# Patient Record
Sex: FEMALE | Race: WHITE | NOT HISPANIC OR LATINO | Employment: OTHER | URBAN - METROPOLITAN AREA
[De-identification: names, ages, dates, MRNs, and addresses within clinical notes are randomized per-mention and may not be internally consistent; named-entity substitution may affect disease eponyms.]

---

## 2017-02-01 ENCOUNTER — GENERIC CONVERSION - ENCOUNTER (OUTPATIENT)
Dept: OTHER | Facility: OTHER | Age: 82
End: 2017-02-01

## 2018-01-09 NOTE — MISCELLANEOUS
Assessment    1  Congestive heart failure (428 0) (I50 9)   2  Chronic kidney disease, stage 3 (585 3) (N18 3)   3  Anemia (285 9) (D64 9)   4  Hypertension (401 9) (I10)   5  Paroxysmal atrial fibrillation (427 31) (I48 0)    Plan  Small bowel obstruction    · (1) CBC/PLT/DIFF; Status:Active; Requested for:21Apr2016;    Perform:[facility]; Due:21Apr2017;Ordered; For:Small bowel obstruction; Ordered By:David Holley;   · (1) COMPREHENSIVE METABOLIC PANEL; Status:Active; Requested for:21Apr2016;    Perform:[facility]; Due:21Apr2017;Ordered; For:Small bowel obstruction; Ordered By:David Holley;   · (1) MAGNESIUM; Status:Active; Requested for:21Apr2016;    Perform:[facility]; Due:21Apr2017;Ordered; For:Small bowel obstruction; Ordered By:David Holley;   · Follow-up visit in 1 month Evaluation and Treatment  Follow-up  Status: Complete  Done:  21Apr2016   Ordered; For: Small bowel obstruction; Ordered By: Ava Garcia Performed:  Due: 26Apr2016; Last Updated By: Bryan Mazariegos; 4/22/2016 9:09:55 AM    Discussion/Summary  Discussion Summary:   The patient is doing pretty well at the moment  Her congestive heart failure is adequately compensated  Her blood pressure is stable  Her kidney function is stable  I asked her to continue with her current medications  Metolazone will be utilized if she gains more than 3 pounds  Lab work will be checked  She will return here in one month  Chief Complaint  Chief Complaint Free Text Note Form: d/c from [facility] 4/1/2016  Metolazone discontinued yesterday due to weight loss of 7 lbs       History of Present Illness  TCM Communication [facility]: The patient is being contacted for 4/21/16  She was hospitalized [facility]  The date of admission: 3/25/2016, date of discharge: 4/1/2016  She was discharged to home, assisted living  Medications reviewed and updated today  She scheduled a follow up appointment     Communication performed and completed by Shamika Sykes   HPI: The patient returned to the office for reevaluation after recent hospitalization at [facility]  The patient was initially admitted after a fall  She sustained a gluteal hematoma  Her anemia worsened as a result of this  The situation was stabilized and she will return to her assisted living  However she was readmitted shortly thereafter with congestive heart failure  Since she has been out of the hospital she has been doing fairly well  Last week however she started to gain weight  Her metolazone was increased and she lost 7 pounds  At the moment she is feeling quite well  She denies chest pain, shortness of breath, palpitations, or dizziness  Review of Systems  Complete-Female:   Constitutional: No fever, no chills, feels well, no tiredness, no recent weight gain or weight loss  Eyes: No complaints of eye pain, no red eyes, no eyesight problems, no discharge, no dry eyes, no itching of eyes  ENT: no complaints of earache, no loss of hearing, no nose bleeds, no nasal discharge, no sore throat, no hoarseness  Cardiovascular: as noted in HPI  Respiratory: No complaints of shortness of breath, no wheezing, no cough, no SOB on exertion, no orthopnea, no PND  Gastrointestinal: No complaints of abdominal pain, no constipation, no nausea or vomiting, no diarrhea, no bloody stools  Genitourinary: No complaints of dysuria, no incontinence, no pelvic pain, no dysmenorrhea, no vaginal discharge or bleeding  Musculoskeletal: No complaints of arthralgias, no myalgias, no joint swelling or stiffness, no limb pain or swelling  Neurological: No complaints of headache, no confusion, no convulsions, no numbness, no dizziness or fainting, no tingling, no limb weakness, no difficulty walking  Psychiatric: Not suicidal, no sleep disturbance, no anxiety or depression, no change in personality, no emotional problems  Endocrine: no muscle weakness  Hematologic/Lymphatic: no tendency for easy bruising  Active Problems    1  Anemia (285 9) (D64 9)   2  Chronic kidney disease, stage 3 (585 3) (N18 3)   3  Congestive heart failure (428 0) (I50 9)   4  Constipation (564 00) (K59 00)   5  Depression (311) (F32 9)   6  Hypertension (401 9) (I10)   7  Hypokalemia (276 8) (E87 6)   8  Insomnia (780 52) (G47 00)   9  Need for pneumococcal vaccination (V03 82) (Z23)   10  Need for prophylactic vaccination and inoculation against influenza (V04 81) (Z23)   11  Paroxysmal atrial fibrillation (427 31) (I48 0)   12  Screening for genitourinary condition (V81 6) (Z13 89)   13  Small bowel obstruction (560 9) (K56 69)    Social History    · Never a smoker   · No alcohol use  Social History Reviewed: The social history was reviewed and is unchanged  Current Meds   1  Aspirin 81 MG TABS; TAKE 1 TABLET DAILY; Therapy: (Recorded:06Mar2015) to Recorded   2  Carvedilol 12 5 MG Oral Tablet; TAKE 1 TABLET THREE TIMES A DAY  Requested for:   59HRL8491; Last Rx:23Nov2015 Ordered   3  Colace 100 MG Oral Capsule; TAKE 1 CAPSULE TWICE DAILY AS NEEDED  Requested   for: 37ZZA0565; Last Rx:23Nov2015 Ordered   4  Ferrous Sulfate 325 (65 Fe) MG Oral Tablet; TAKE 1 TABLET BY MOUTH BID  WITH   FOOD; Therapy: (Recorded:06Mar2015) to Recorded   5  FLUoxetine HCl - 10 MG Oral Capsule; TAKE 1 CAPSULE DAILY  Requested for:   41FCZ4342; Last Rx:23Nov2015 Ordered   6  Furosemide 80 MG Oral Tablet; TAKE 1 TABLET TWICE DAILY  Requested for:   04GXF6743; Last Rx:23Nov2015 Ordered   7  Melatonin 3 MG Oral Tablet; TAKE 2 TABLETS (6MG) BY MOUTH DAILY AT 8PM (SLEEP); Therapy: 94FAC6661 to (Last Rx:93Ona4945)  Requested for: 39Zkw6647 Ordered   8  Metolazone 5 MG Oral Tablet; as needed for edema; Therapy: (Recorded:21Apr2016) to  Requested for: 21Apr2016 Recorded   9  Tylenol 325 MG Oral Tablet; Take 2 tablets every 4 hours as needed;    Therapy: (Recorded:13Ydr8121) to Recorded    Allergies    1  No Known Drug Allergies    Vitals  Signs [Data Includes: Current Encounter]   Recorded: 21Apr2016 02:52PM   Temperature: 98 2 F  Heart Rate: 80  Respiration: 20  Systolic: 301  Diastolic: 70  Height: 4 ft 8 in  Weight: 131 lb   BMI Calculated: 29 37  BSA Calculated: 1 48    Physical Exam    Constitutional   General appearance: No acute distress, well appearing and well nourished  Pulmonary   Respiratory effort: No increased work of breathing or signs of respiratory distress  Auscultation of lungs: Clear to auscultation  Cardiovascular   Palpation of heart: Normal PMI, no thrills  Auscultation of heart: Normal rate and rhythm, normal S1 and S2, without murmurs  Examination of extremities for edema and/or varicosities: Abnormal   +2 edema  Carotid pulses: Normal     Abdomen   Abdomen: Non-tender, no masses  Liver and spleen: No hepatomegaly or splenomegaly  Lymphatic   Palpation of lymph nodes in neck: No lymphadenopathy  Musculoskeletal   Gait and station: Normal     Inspection/palpation of joints, bones, and muscles: Normal     Psychiatric   Orientation to person, place, and time: Abnormal   The patient was oriented to person place and time but her memory was obviously impaired  Mood and affect: Normal          Future Appointments    Date/Time Provider Specialty Site   05/26/2016 02:45 PM TANK Pena   [facility]     Signatures   Electronically signed by : TANK Rios ; Apr 22 2016  4:40PM EST                       (Author)

## 2018-01-10 NOTE — MISCELLANEOUS
Provider Comments  Provider Comments:   Patient NO SHOW on 3/3/2016 at 3:30pm letter sent to patient      Signatures   Electronically signed by : TANK Orantes ; Mar 17 2016  7:21AM EST                       (Author)

## 2018-01-12 NOTE — MISCELLANEOUS
Message   Date: 01 Feb 2017 2:09 PM EST, Recorded By: Jenny Polk   Calling For: Jenny Polk   Caller: [facility] nurse called to stated that the patient is in heart failure  She is currently getting Lasix 80mg BID and Zaroxyln 5mg  She is requesting an increase in the Ativan and Roxanol  Order would be for Ativan 0 5mg Q6H around the clock and Q2H prn and the Roxanol 5mg Q6h around the clock and Q2H prn  Dr Opal Slater these orders  Active Problems    1  Anemia (285 9) (D64 9)   2  Chronic kidney disease, stage 3 (585 3) (N18 3)   3  Congestive heart failure (428 0) (I50 9)   4  Constipation (564 00) (K59 00)   5  Cough (786 2) (R05)   6  Depression (311) (F32 9)   7  Hypertension (401 9) (I10)   8  Hypokalemia (276 8) (E87 6)   9  Insomnia (780 52) (G47 00)   10  Need for pneumococcal vaccination (V03 82) (Z23)   11  Need for prophylactic vaccination and inoculation against influenza (V04 81) (Z23)   12  Paroxysmal atrial fibrillation (427 31) (I48 0)   13  Screening for genitourinary condition (V81 6) (Z13 89)   14  Small bowel obstruction (560 9) (K56 69)    Current Meds   1  Aspirin 81 MG TABS; TAKE 1 TABLET DAILY; Therapy: (Recorded:06Mar2015) to Recorded   2  Ativan 0 5 MG Oral Tablet; TAKE 1 TABLET EVERY 6 HOURS AS NEEDED; Therapy: (Recorded:03Nov2016) to Recorded   3  Carvedilol 12 5 MG Oral Tablet; TAKE 1 TABLET BY MOUTH 3X DAILY AT 8AM-2PM-8PM   (HYPERTENSION); Therapy: 08Apr2016 to (Last Rx:08Apr2016)  Requested for: 08Apr2016 Ordered   4  Colace 100 MG Oral Capsule; TAKE 1 CAPSULE TWICE DAILY AS NEEDED    Requested for: 77UWL8259; Last Rx:23Nov2015 Ordered   5  Ferrous Sulfate 325 (65 Fe) MG Oral Tablet; 1 TAB BY MOUTH 2X DAILY AT 8AM & 5PM   (SUPPLEMENT); Therapy: 87AVH7140 to (Last Rx:05Jan2017)  Requested for: 53WZX0865 Ordered   6  FLUoxetine HCl - 10 MG Oral Capsule; TAKE 1 CAPSULE BY MOUTH DAILY AT 8AM   (PREVENT ANXIETY);    Therapy: 08Apr2016 to (Evaluate:2017)  Requested for: 38FUY9828; Last   LH:75OPO1999 Ordered   7  Furosemide 80 MG Oral Tablet; TAKE 1 TABLET BY MOUTH TWICE DAILY AT 6AM-2PM   (EDEMA); Therapy: 2016 to (Last Q84LGM9649)  Requested for: 2017 Ordered   8  Ipratropium-Albuterol 0 5-2 5 (3) MG/3ML Inhalation Solution; USE VIAL VIA NEB   EVERY 6 HOURS AT 6Q-43D-4Z-12A (SOB/WHEEZE); Therapy: 73WWZ1716 to (Last Rx:69Eru5481)  Requested for: 10Osf2430 Ordered   9  Melatonin 3 MG Oral Tablet; TAKE 2 TABLETS (6MG) BY MOUTH DAILY AT 8PM   (SLEEP); Therapy: 69HPJ3875 to  Requested for: 80NEN1343 Recorded   10  MetOLazone 5 MG Oral Tablet; TAKE 1 TABLET BY MOUTH ONCE DAILY AT 6AM    (EDEMA); Therapy: 14DVS4607 to (Last WQ:90UYH3388)  Requested for: 57HMI6401 Ordered   11  Mucinex 600 MG Oral Tablet Extended Release 12 Hour; TAKE 1 TABLET EVERY 12    HOURS; Therapy: 07BUR9265 to (Shaw Edwards)  Requested for: 70Mnq3858; Last    Rx:32Jlg4510 Ordered   12  Potassium Chloride [place] ER 20 MEQ Oral Tablet Extended Release; TAKE 1 TAB BY 2X    MOUTH DAILY AT 8AM- 5PM (POTASSIUM SUPPLEMENT); Therapy: 2016 to (Last Rx:2016)  Requested for: 2016 Ordered   13  Tylenol 325 MG Oral Tablet; Take 2 tablets PO 3 times a day  Requested for: 00QCH2625;    Last Rx:2017 Ordered    Allergies    1   No Known Drug Allergies    Signatures   Electronically signed by : TANK Barksdale ; 2017 10:00AM EST                       (Author)

## 2018-01-12 NOTE — MISCELLANEOUS
Message   Date: 10 Oct 2016 2:11 PM EST, Recorded By: Aliya Pop For: Rd Holley   Caller: hospice Nurse   Kathy Borja called ([phone]) visit with patient this morning, found her walking around, to ER yesterday, impacted Fx left hip, does not have pain  Nurse wants order to ambulate as tolerated, will call Hospice doctor for order  Active Problems    1  Anemia (285 9) (D64 9)   2  Chronic kidney disease, stage 3 (585 3) (N18 3)   3  Congestive heart failure (428 0) (I50 9)   4  Constipation (564 00) (K59 00)   5  Cough (786 2) (R05)   6  Depression (311) (F32 9)   7  Hypertension (401 9) (I10)   8  Hypokalemia (276 8) (E87 6)   9  Insomnia (780 52) (G47 00)   10  Need for pneumococcal vaccination (V03 82) (Z23)   11  Need for prophylactic vaccination and inoculation against influenza (V04 81) (Z23)   12  Paroxysmal atrial fibrillation (427 31) (I48 0)   13  Screening for genitourinary condition (V81 6) (Z13 89)   14  Small bowel obstruction (560 9) (K56 69)    Current Meds   1  Aspirin 81 MG TABS; TAKE 1 TABLET DAILY; Therapy: (Recorded:06Mar2015) to Recorded   2  Carvedilol 12 5 MG Oral Tablet; TAKE 1 TABLET BY MOUTH 3X DAILY AT 8AM-2PM-8PM   (HYPERTENSION); Therapy: 08Apr2016 to (Last Rx:08Apr2016)  Requested for: 08Apr2016 Ordered   3  Colace 100 MG Oral Capsule; TAKE 1 CAPSULE TWICE DAILY AS NEEDED    Requested for: 24UXP6664; Last Rx:23Nov2015 Ordered   4  FLUoxetine HCl - 10 MG Oral Capsule; TAKE 1 CAPSULE BY MOUTH DAILY AT 8AM   (PREVENT ANXIETY); Therapy: 08Apr2016 to (Last Rx:08Apr2016)  Requested for: 08Apr2016 Ordered   5  Furosemide 80 MG Oral Tablet; TAKE 1 TABLET BY MOUTH TWICE DAILY AT 6AM-2PM   (EDEMA); Therapy: 08Apr2016 to (Last Rx:08Apr2016)  Requested for: 08Apr2016 Ordered   6  GuaiFENesin  MG Oral Tablet Extended Release 12 Hour; TAKE 1 TABLET   TWICE DAILY AS NEEDED; Therapy: 46DJL0777 to (Evaluate:67Acr0690); Last Rx:08Jun2016 Ordered   7  Ipratropium-Albuterol 0 5-2 5 (3) MG/3ML Inhalation Solution; USE VIAL VIA NEB   EVERY 6 HOURS AT 0I-54K-4N-12A (SOB/WHEEZE); Therapy: 01JRT2704 to (Last Rx:85Cyc3520)  Requested for: 52Jwx5772 Ordered   8  Melatonin 3 MG Oral Tablet; TAKE 2 TABLETS (6MG) BY MOUTH DAILY AT 8PM   (SLEEP); Therapy: 98CQK2274 to  Requested for: 79NSW0299 Recorded   9  Metolazone 5 MG Oral Tablet; as needed for edema; Therapy: (Recorded:26Qex9812) to  Requested for: 21Apr2016 Recorded   10  Potassium Chloride [place] ER 20 MEQ Oral Tablet Extended Release; TAKE 1 TAB BY 2X    MOUTH DAILY AT 8AM- 5PM (POTASSIUM SUPPLEMENT); Therapy: 08Apr2016 to (Last Rx:16Hxz7888)  Requested for: 08Apr2016 Ordered   11  Tylenol 325 MG Oral Tablet; Take 2 tablets every 4 hours as needed; Therapy: (Recorded:54Puv0528) to Recorded    Allergies    1   No Known Drug Allergies    Signatures   Electronically signed by : TANK Grimm ; Oct 12 2016  7:44AM EST                       (Author)

## 2018-01-16 NOTE — MISCELLANEOUS
History of Present Illness    The patient is being contacted for follow-up after hospitalization  This was not a readmission  LM with facility   The date of discharge was 4/1/16  Current Meds   1  Aspirin 81 MG Oral Tablet; TAKE 1 TABLET DAILY; Therapy: (Recorded:06Mar2015) to Recorded   2  Carvedilol 12 5 MG Oral Tablet (Coreg); TAKE 1 TABLET THREE TIMES A DAY  Requested   for: 89ARC7516; Last Rx:23Nov2015 Ordered   3  Colace 100 MG Oral Capsule; TAKE 1 CAPSULE TWICE DAILY AS NEEDED  Requested   for: 58LGS3787; Last Rx:23Nov2015 Ordered   4  Ferrous Sulfate 325 (65 Fe) MG Oral Tablet; TAKE 1 TABLET BY MOUTH BID  WITH   FOOD; Therapy: (Recorded:06Mar2015) to Recorded   5  FLUoxetine HCl - 10 MG Oral Capsule; TAKE 1 CAPSULE DAILY  Requested for:   38WVB9901; Last Rx:23Nov2015 Ordered   6  Furosemide 80 MG Oral Tablet (Lasix); TAKE 1 TABLET TWICE DAILY  Requested for:   54IFR9689; Last Rx:23Nov2015 Ordered   7  Melatonin 3 MG Oral Tablet; TAKE 2 TABLETS (6MG) BY MOUTH DAILY AT 8PM (SLEEP); Therapy: 12NZK5064 to (Last Rx:82Mbe2236)  Requested for: 92Omm3410 Ordered   8  Metolazone 5 MG Oral Tablet; TAKE 1 TABLET DAILY ON MONDAY, WEDNESDAY, AND   FRIDAY  Requested for: 75PEN4679; Last Rx:23Nov2015 Ordered   9  Potassium Chloride ER 20 MEQ Oral Tablet Extended Release; take 2 tablet daily; Therapy: 25LWI5874 to (Evaluate:46Pny4430)  Requested for: 48VLW1339 Recorded   10  Tylenol 325 MG Oral Tablet; Take 2 tablets every 4 hours as needed; Therapy: (Recorded:30Jci5756) to Recorded   11  Vitamin C 500 MG Oral Tablet; 1 TAB TWO TIMES PER DAILY; Therapy: (Recorded:06Mar2015) to Recorded    Allergies    1   No Known Drug Allergies    Signatures   Electronically signed by : Nettie Aguilera, ; Apr 5 2016  2:22PM EST                       (Author)

## 2018-01-17 NOTE — PROGRESS NOTES
Assessment    1  Congestive heart failure (428 0) (I50 9)   2  Chronic kidney disease, stage 3 (585 3) (N18 3)   3  Anemia (285 9) (D64 9)   4  Hypertension (401 9) (I10)   5  Paroxysmal atrial fibrillation (427 31) (I48 0)    Plan  Paroxysmal atrial fibrillation    · Follow-up visit in 6 weeks Evaluation and Treatment  Follow-up  Status: Hold For - Scheduling   Requested for: 21Jan2016    Discussion/Summary  Discussion Summary:   The patient appears to be doing reasonably well  Her heart failure seems compensated  Her blood pressure is adequately controlled  Her hemoglobin is stable  Her kidney function is stable  She will have a repeat CBC and BMP in one month  She will return here for followup in 6 weeks  Chief Complaint  Chief Complaint Free Text Note Form: 3 month f/u for CHF      History of Present Illness  HPI: the patient returned to the office for reevaluation of chronic diastolic congestive heart failure, chronic kidney disease stage III, anemia, and hypertension  Since her last visit she has been feeling reasonably well  She denies chest pain, shortness of breath, palpitations, orthopnea, or dizziness  She has chronic edema which has not changed  She does have dyspnea on exertion which if anything is a little better than it has been in the past  She is having no issues with her medications  Review of Systems  Complete-Female:   Constitutional: No fever, no chills, feels well, no tiredness, no recent weight gain or weight loss  Eyes: No complaints of eye pain, no red eyes, no eyesight problems, no discharge, no dry eyes, no itching of eyes  ENT: no complaints of earache, no loss of hearing, no nose bleeds, no nasal discharge, no sore throat, no hoarseness  Cardiovascular: as noted in HPI  Respiratory: No complaints of shortness of breath, no wheezing, no cough, no SOB on exertion, no orthopnea, no PND     Gastrointestinal: No complaints of abdominal pain, no constipation, no nausea or vomiting, no diarrhea, no bloody stools  Genitourinary: No complaints of dysuria, no incontinence, no pelvic pain, no dysmenorrhea, no vaginal discharge or bleeding  Musculoskeletal: No complaints of arthralgias, no myalgias, no joint swelling or stiffness, no limb pain or swelling  Neurological: No complaints of headache, no confusion, no convulsions, no numbness, no dizziness or fainting, no tingling, no limb weakness, no difficulty walking  Psychiatric: Not suicidal, no sleep disturbance, no anxiety or depression, no change in personality, no emotional problems  Endocrine: no muscle weakness  Hematologic/Lymphatic: no tendency for easy bruising  Active Problems    1  Anemia (285 9) (D64 9)   2  Chronic kidney disease, stage 3 (585 3) (N18 3)   3  Congestive heart failure (428 0) (I50 9)   4  Constipation (564 00) (K59 00)   5  Depression (311) (F32 9)   6  Hypertension (401 9) (I10)   7  Hypokalemia (276 8) (E87 6)   8  Insomnia (780 52) (G47 00)   9  Need for pneumococcal vaccination (V03 82) (Z23)   10  Need for prophylactic vaccination and inoculation against influenza (V04 81) (Z23)   11  Paroxysmal atrial fibrillation (427 31) (I48 0)   12  Screening for genitourinary condition (V81 6) (Z13 89)   13  Small bowel obstruction (560 9) (K56 69)    Past Medical History  Active Problems And Past Medical History Reviewed: The active problems and past medical history were reviewed and updated today  Social History    · Never a smoker  Social History Reviewed: The social history was reviewed and is unchanged  Current Meds   1  Aspirin 81 MG Oral Tablet; TAKE 1 TABLET DAILY; Therapy: (Recorded:06Mar2015) to Recorded   2  Carvedilol 12 5 MG Oral Tablet; TAKE 1 TABLET THREE TIMES A DAY  Requested for: 83EIM3459; Last Rx:23Nov2015 Ordered   3  Colace 100 MG Oral Capsule; TAKE 1 CAPSULE TWICE DAILY AS NEEDED  Requested for:   27TVR0950; Last Rx:23Nov2015 Ordered   4   Ferrous Sulfate 325 (65 Fe) MG Oral Tablet; TAKE 1 TABLET BY MOUTH BID  WITH FOOD; Therapy: (Recorded:06Mar2015) to Recorded   5  FLUoxetine HCl - 10 MG Oral Capsule; TAKE 1 CAPSULE DAILY  Requested for: 19SPK1710; Last   Rx:23Nov2015 Ordered   6  Furosemide 80 MG Oral Tablet; TAKE 1 TABLET TWICE DAILY  Requested for: 05CLK4655; Last   Rx:23Nov2015 Ordered   7  Melatonin 3 MG Oral Tablet; 2 TABLETS AT BEDTIME; Therapy: (Recorded:02Jul2015) to Recorded   8  Metolazone 5 MG Oral Tablet; TAKE 1 TABLET DAILY ON MONDAY, WEDNESDAY, AND FRIDAY    Requested for: 12OOA0021; Last Rx:23Nov2015 Ordered   9  Potassium Chloride ER 20 MEQ Oral Tablet Extended Release; take 2 tablet daily; Therapy: 47MRW1940 to (Evaluate:52Poc3943)  Requested for: 01CYG9412 Recorded   10  Tylenol 325 MG Oral Tablet; Take 2 tablets every 4 hours as needed; Therapy: (Recorded:02Jul2015) to Recorded   11  Vitamin C 500 MG Oral Tablet; 1 TAB TWO TIMES PER DAILY; Therapy: (Recorded:06Mar2015) to Recorded    Allergies    1  No Known Drug Allergies    Vitals  Vital Signs [Data Includes: Current Encounter]    Recorded: 21Jan2016 03:46PM   Temperature 98 3 F   Heart Rate 64   Respiration 15   Systolic 018   Diastolic 80   Height 4 ft 8 in   Weight 36 lb 6 08 oz   BMI Calculated 8 16     Physical Exam    Constitutional   General appearance: No acute distress, well appearing and well nourished  Pulmonary   Respiratory effort: No increased work of breathing or signs of respiratory distress  Auscultation of lungs: Clear to auscultation  Cardiovascular   Palpation of heart: Normal PMI, no thrills  Auscultation of heart: Normal rate and rhythm, normal S1 and S2, without murmurs  Examination of extremities for edema and/or varicosities: Abnormal   +2 edema  Carotid pulses: Normal     Abdomen   Abdomen: Non-tender, no masses  Liver and spleen: No hepatomegaly or splenomegaly  Lymphatic   Palpation of lymph nodes in neck: No lymphadenopathy  Musculoskeletal   Gait and station: Normal     Inspection/palpation of joints, bones, and muscles: Normal     Psychiatric   Orientation to person, place, and time: Abnormal   The patient was oriented to person place and time but her memory was obviously impaired  Mood and affect: Normal          Future Appointments    Date/Time Provider Specialty Site   03/03/2016 03:30 PM TANK Mederos   Internal Medicine [facility]     Signatures   Electronically signed by : TANK Ayala ; Jan 21 2016  5:43PM EST                       (Author)

## 2018-01-18 NOTE — MISCELLANEOUS
Chief Complaint  Chief Complaint Free Text Note Form: d/c to assisted living  appointment cancelled for 4/1/16, patient presently in [facility]        1 Amended By: Judd Peng; Mar 31 2016 7:34 AM EST    History of Present Illness  TCM Communication  Sam Kasal: The patient is being contacted for 4/1/16  She was hospitalized [facility]  The date of admission: 3/17/16, date of discharge: 3/23/16  Diagnosis: fall/ left gluteal hematoma, anemia  She was discharged to home  She scheduled a follow up appointment  Communication performed and completed by Ashli Jalloh      Active Problems   1  Anemia (285 9) (D64 9)  2  Chronic kidney disease, stage 3 (585 3) (N18 3)  3  Congestive heart failure (428 0) (I50 9)  4  Constipation (564 00) (K59 00)  5  Depression (311) (F32 9)  6  Hypertension (401 9) (I10)  7  Hypokalemia (276 8) (E87 6)  8  Insomnia (780 52) (G47 00)  9  Need for pneumococcal vaccination (V03 82) (Z23)  10  Need for prophylactic vaccination and inoculation against influenza (V04 81) (Z23)  11  Paroxysmal atrial fibrillation (427 31) (I48 0)  12  Screening for genitourinary condition (V81 6) (Z13 89)  13  Small bowel obstruction (560 9) (K56 69)    Social History    · Never a smoker    Current Meds  1  Aspirin 81 MG Oral Tablet; TAKE 1 TABLET DAILY; Therapy: (Recorded:06Mar2015) to Recorded  2  Carvedilol 12 5 MG Oral Tablet; TAKE 1 TABLET THREE TIMES A DAY  Requested for:   68DKU5412; Last Rx:23Nov2015 Ordered  3  Colace 100 MG Oral Capsule; TAKE 1 CAPSULE TWICE DAILY AS NEEDED  Requested   for: 94XZB6995; Last Rx:23Nov2015 Ordered  4  Ferrous Sulfate 325 (65 Fe) MG Oral Tablet; TAKE 1 TABLET BY MOUTH BID  WITH   FOOD; Therapy: (Recorded:06Mar2015) to Recorded  5  FLUoxetine HCl - 10 MG Oral Capsule; TAKE 1 CAPSULE DAILY  Requested for:   42DHW9842; Last Rx:23Nov2015 Ordered  6  Furosemide 80 MG Oral Tablet; TAKE 1 TABLET TWICE DAILY  Requested for:   44HJY3883; Last Rx:23Nov2015 Ordered  7   Melatonin 3 MG Oral Tablet; TAKE 2 TABLETS (6MG) BY MOUTH DAILY AT 8PM (SLEEP); Therapy: 82TCV4764 to (Last Rx:69Got2940)  Requested for: 44Wmq6245 Ordered  8  Metolazone 5 MG Oral Tablet; TAKE 1 TABLET DAILY ON MONDAY, WEDNESDAY, AND   FRIDAY  Requested for: 33SMU5995; Last Rx:23Nov2015 Ordered  9  Potassium Chloride ER 20 MEQ Oral Tablet Extended Release; take 2 tablet daily; Therapy: 90RET2990 to (Evaluate:80Fdn7665)  Requested for: 07LVR6148 Recorded  10  Tylenol 325 MG Oral Tablet; Take 2 tablets every 4 hours as needed; Therapy: (Recorded:24Acg7198) to Recorded  11  Vitamin C 500 MG Oral Tablet; 1 TAB TWO TIMES PER DAILY; Therapy: (Recorded:06Mar2015) to Recorded    Allergies   1  No Known Drug Allergies    Future Appointments    Date/Time Provider Specialty Site   04/01/2016 02:15 PM TANK Mena   Internal Medicine [facility] [place]     Signatures   Electronically signed by : TANK Hdz ; Mar 30 2016  8:57AM EST                       (Author)    Electronically signed by : TANK Hdz ; Apr 1 2016 12:57PM EST                       (Author)